# Patient Record
Sex: FEMALE | Race: WHITE | NOT HISPANIC OR LATINO | Employment: OTHER | URBAN - METROPOLITAN AREA
[De-identification: names, ages, dates, MRNs, and addresses within clinical notes are randomized per-mention and may not be internally consistent; named-entity substitution may affect disease eponyms.]

---

## 2017-10-16 ENCOUNTER — OFFICE VISIT (OUTPATIENT)
Dept: ORTHOPEDIC SURGERY | Facility: CLINIC | Age: 67
End: 2017-10-16

## 2017-10-16 VITALS — WEIGHT: 145 LBS | HEIGHT: 64 IN | BODY MASS INDEX: 24.75 KG/M2 | TEMPERATURE: 97.5 F

## 2017-10-16 DIAGNOSIS — M25.552 LEFT HIP PAIN: Primary | ICD-10-CM

## 2017-10-16 DIAGNOSIS — M70.62 TROCHANTERIC BURSITIS OF LEFT HIP: ICD-10-CM

## 2017-10-16 PROCEDURE — 73502 X-RAY EXAM HIP UNI 2-3 VIEWS: CPT | Performed by: ORTHOPAEDIC SURGERY

## 2017-10-16 PROCEDURE — 99203 OFFICE O/P NEW LOW 30 MIN: CPT | Performed by: ORTHOPAEDIC SURGERY

## 2017-10-16 PROCEDURE — 20610 DRAIN/INJ JOINT/BURSA W/O US: CPT | Performed by: ORTHOPAEDIC SURGERY

## 2017-10-16 RX ORDER — LIDOCAINE HYDROCHLORIDE 10 MG/ML
2 INJECTION, SOLUTION INFILTRATION; PERINEURAL
Status: COMPLETED | OUTPATIENT
Start: 2017-10-16 | End: 2017-10-16

## 2017-10-16 RX ORDER — BUPIVACAINE HYDROCHLORIDE 5 MG/ML
2 INJECTION, SOLUTION PERINEURAL
Status: COMPLETED | OUTPATIENT
Start: 2017-10-16 | End: 2017-10-16

## 2017-10-16 RX ORDER — METHYLPREDNISOLONE ACETATE 80 MG/ML
160 INJECTION, SUSPENSION INTRA-ARTICULAR; INTRALESIONAL; INTRAMUSCULAR; SOFT TISSUE
Status: COMPLETED | OUTPATIENT
Start: 2017-10-16 | End: 2017-10-16

## 2017-10-16 RX ADMIN — LIDOCAINE HYDROCHLORIDE 2 ML: 10 INJECTION, SOLUTION INFILTRATION; PERINEURAL at 13:44

## 2017-10-16 RX ADMIN — METHYLPREDNISOLONE ACETATE 160 MG: 80 INJECTION, SUSPENSION INTRA-ARTICULAR; INTRALESIONAL; INTRAMUSCULAR; SOFT TISSUE at 13:44

## 2017-10-16 RX ADMIN — BUPIVACAINE HYDROCHLORIDE 2 ML: 5 INJECTION, SOLUTION PERINEURAL at 13:44

## 2017-10-16 NOTE — PROGRESS NOTES
Patient: Audrey Zapata    YOB: 1950    Medical Record Number: [id number]    Chief Complaints:  Left hip pain    History of Present Illness:     67 y.o. female patient who presents with long history of lateral sided hip pain. Denies any discreet precipitating event or factor.   She describes gradual onset pain for the last month or so.  The pain as moderate, intermittent, and aching.  She localizes it to the lateral aspect of the hip.  It is worse going upstairs, when seated for a prolonged time, and rolling onto her left side at night.  Heat does help somewhat.  Denies any radicular symptoms down the leg.  Denies any other issues or complaints.    Allergies: No Known Allergies    Medications:     Home Medications:  No current outpatient prescriptions on file prior to visit.     No current facility-administered medications on file prior to visit.      Past Medical History:   Diagnosis Date   • Cancer     SKIN CANCER     Past Surgical History:   Procedure Laterality Date   •  SECTION     •  SECTION       Social History     Occupational History   • Not on file.     Social History Main Topics   • Smoking status: Never Smoker   • Smokeless tobacco: Never Used   • Alcohol use Yes      Comment: 2 DRINKS PER WEEK   • Drug use: No   • Sexual activity: Defer      Social History     Social History Narrative     Family History   Problem Relation Age of Onset   • Stroke Other    • Skin cancer Other        Review of Systems:      Constitutional: Denies fever, shaking or chills   Eyes: Denies change in visual acuity   HEENT: Denies nasal congestion or sore throat   Respiratory: Denies cough or shortness of breath   Cardiovascular: Denies chest pain or edema  Endocrine: Denies tremors, palpitations, intolerance of heat or cold, polyuria, polydipsia.  GI: Denies abdominal pain, nausea, vomiting, bloody stools or diarrhea  : Denies frequency, urgency, incontinence, retention, or  "nocturia.  Musculoskeletal: Denies numbness tingling or loss of motor function except as above  Integument: Denies rash, lesion or ulceration   Neurologic: Denies headache or focal weakness, deficits  Heme: Denies epistaxis, spontaneous or excessive bleeding, epistaxis, hematuria, melena, fatigue, enlarged or tender lymph nodes.      All other pertinent positives and negatives as noted above in HPI.    Physical Exam: 67 y.o. female     Vitals:    10/16/17 1323   Temp: 97.5 °F (36.4 °C)   Weight: 145 lb (65.8 kg)   Height: 64\" (162.6 cm)     General:  Patient is awake and alert.  Appears in no acute distress or discomfort.    Psych:  Affect and demeanor are appropriate.    Eyes:  Conjunctiva and sclera appear grossly normal.  Eyes track well and EOM seem to be intact.    Ears:  No gross abnormalities.  Hearing adequate for the exam.    Cardiovascular:  Regular rate and rhythm.    Lungs:  Good chest expansion.  Breathing unlabored.    Back:  No gross abnormalities.  No tenderness or step-offs.  No palpable masses.  Good motion.  Negative straight leg raise and cross straight leg raise.    Left lower extremity:  Skin is benign.  No palpable masses or adenopathy.  Focal TTP over greater trochanter.  Good hip motion.  Negative Stinchfield maneuver.  Negative GABRIELLE and FADIR impingement manuevers.  Good strength with hip flexion, extension, abduction.  Good strength distally with plantarflexion and dorsiflexion of ankle, toes.  Sensation to light touch intact.  Good pedal pulses with brisk cap refill.    Radiology: AP pelvis and lateral views of the left hip are ordered by myself and reviewed to evaluate the patient's complaint.  No comparison films are immediately available.  The x-rays show no obvious acute abnormalities, lesions, masses, significant degenerative changes, or other concerning findings.    Assessment/Plan:  Left hip trochanteric bursitis.    I had a lengthy discussion with her regarding options, both " surgical and non-surgical.  I have recommended that we start with a conservative approach and suggested anti-inflammatories, physical therapy, and an injection.  All options were thoroughly discussed with her and she consented to proceed with the injection.  We did discuss PT and they have elected for a home program for now.  Home exercises were demonstrated.    Large Joint Arthrocentesis  Date/Time: 10/16/2017 1:44 PM  Consent given by: patient  Site marked: site marked  Timeout: Immediately prior to procedure a time out was called to verify the correct patient, procedure, equipment, support staff and site/side marked as required   Supporting Documentation  Indications: pain   Procedure Details  Location: hip -   Preparation: Patient was prepped and draped in the usual sterile fashion  Needle size: 25 G  Approach: anterolateral  Medications administered: 2 mL lidocaine 1 %; 160 mg methylPREDNISolone acetate 80 MG/ML; 2 mL bupivacaine  Patient tolerance: patient tolerated the procedure well with no immediate complications          Diego Donohue MD    10/16/2017    CC to Funmi Esteban MD

## 2020-08-20 ENCOUNTER — TRANSCRIBE ORDERS (OUTPATIENT)
Dept: ADMINISTRATIVE | Facility: HOSPITAL | Age: 70
End: 2020-08-20

## 2020-08-20 DIAGNOSIS — R94.31 ABNORMAL EKG: Primary | ICD-10-CM

## 2020-08-28 ENCOUNTER — HOSPITAL ENCOUNTER (OUTPATIENT)
Dept: CARDIOLOGY | Facility: HOSPITAL | Age: 70
Discharge: HOME | End: 2020-08-28
Admitting: INTERNAL MEDICINE

## 2020-08-28 VITALS
HEIGHT: 64 IN | DIASTOLIC BLOOD PRESSURE: 66 MMHG | WEIGHT: 145 LBS | BODY MASS INDEX: 24.75 KG/M2 | HEART RATE: 64 BPM | SYSTOLIC BLOOD PRESSURE: 113 MMHG

## 2020-08-28 DIAGNOSIS — R94.31 ABNORMAL EKG: ICD-10-CM

## 2020-08-28 LAB
AORTIC DIMENSIONLESS INDEX: 0.7 (DI)
ASCENDING AORTA: 2.8 CM
BH CV ECHO MEAS - ACS: 1.8 CM
BH CV ECHO MEAS - AO MAX PG: 4.2 MMHG
BH CV ECHO MEAS - AO MEAN PG (FULL): 1 MMHG
BH CV ECHO MEAS - AO MEAN PG: 2 MMHG
BH CV ECHO MEAS - AO ROOT AREA (BSA CORRECTED): 1.5
BH CV ECHO MEAS - AO ROOT AREA: 5.3 CM^2
BH CV ECHO MEAS - AO ROOT DIAM: 2.6 CM
BH CV ECHO MEAS - AO V2 MAX: 102 CM/SEC
BH CV ECHO MEAS - AO V2 MEAN: 65.9 CM/SEC
BH CV ECHO MEAS - AO V2 VTI: 22.9 CM
BH CV ECHO MEAS - ASC AORTA: 2.8 CM
BH CV ECHO MEAS - AVA(I,A): 2.6 CM^2
BH CV ECHO MEAS - AVA(I,D): 2.6 CM^2
BH CV ECHO MEAS - BSA(HAYCOCK): 1.7 M^2
BH CV ECHO MEAS - BSA: 1.7 M^2
BH CV ECHO MEAS - BZI_BMI: 24.9 KILOGRAMS/M^2
BH CV ECHO MEAS - BZI_METRIC_HEIGHT: 162.6 CM
BH CV ECHO MEAS - BZI_METRIC_WEIGHT: 65.8 KG
BH CV ECHO MEAS - EDV(CUBED): 86.9 ML
BH CV ECHO MEAS - EDV(MOD-SP2): 63.2 ML
BH CV ECHO MEAS - EDV(MOD-SP4): 62.9 ML
BH CV ECHO MEAS - EDV(TEICH): 89.1 ML
BH CV ECHO MEAS - EF(CUBED): 59.8 %
BH CV ECHO MEAS - EF(MOD-BP): 58.7 %
BH CV ECHO MEAS - EF(MOD-SP2): 57.9 %
BH CV ECHO MEAS - EF(MOD-SP4): 58.2 %
BH CV ECHO MEAS - EF(TEICH): 51.6 %
BH CV ECHO MEAS - ESV(CUBED): 35 ML
BH CV ECHO MEAS - ESV(MOD-SP2): 26.6 ML
BH CV ECHO MEAS - ESV(MOD-SP4): 26.3 ML
BH CV ECHO MEAS - ESV(TEICH): 43.2 ML
BH CV ECHO MEAS - FS: 26.2 %
BH CV ECHO MEAS - IVS/LVPW: 1.2
BH CV ECHO MEAS - IVSD: 0.78 CM
BH CV ECHO MEAS - LAT PEAK E' VEL: 9.4 CM/SEC
BH CV ECHO MEAS - LV DIASTOLIC VOL/BSA (35-75): 36.9 ML/M^2
BH CV ECHO MEAS - LV MASS(C)D: 95.8 GRAMS
BH CV ECHO MEAS - LV MASS(C)DI: 56.1 GRAMS/M^2
BH CV ECHO MEAS - LV MAX PG: 2.1 MMHG
BH CV ECHO MEAS - LV MEAN PG: 1 MMHG
BH CV ECHO MEAS - LV SYSTOLIC VOL/BSA (12-30): 15.4 ML/M^2
BH CV ECHO MEAS - LV V1 MAX: 72.8 CM/SEC
BH CV ECHO MEAS - LV V1 MEAN: 49 CM/SEC
BH CV ECHO MEAS - LV V1 VTI: 17 CM
BH CV ECHO MEAS - LVIDD: 4.4 CM
BH CV ECHO MEAS - LVIDS: 3.3 CM
BH CV ECHO MEAS - LVLD AP2: 6.2 CM
BH CV ECHO MEAS - LVLD AP4: 6.6 CM
BH CV ECHO MEAS - LVLS AP2: 5.3 CM
BH CV ECHO MEAS - LVLS AP4: 5.6 CM
BH CV ECHO MEAS - LVOT AREA (M): 3.5 CM^2
BH CV ECHO MEAS - LVOT AREA: 3.5 CM^2
BH CV ECHO MEAS - LVOT DIAM: 2.1 CM
BH CV ECHO MEAS - LVPWD: 0.66 CM
BH CV ECHO MEAS - MED PEAK E' VEL: 7.4 CM/SEC
BH CV ECHO MEAS - MV A DUR: 0.17 SEC
BH CV ECHO MEAS - MV A MAX VEL: 63.1 CM/SEC
BH CV ECHO MEAS - MV DEC SLOPE: 161 CM/SEC^2
BH CV ECHO MEAS - MV DEC TIME: 235 SEC
BH CV ECHO MEAS - MV E MAX VEL: 43.2 CM/SEC
BH CV ECHO MEAS - MV E/A: 0.68
BH CV ECHO MEAS - MV MEAN PG: 1 MMHG
BH CV ECHO MEAS - MV P1/2T MAX VEL: 65.2 CM/SEC
BH CV ECHO MEAS - MV P1/2T: 118.6 MSEC
BH CV ECHO MEAS - MV V2 MEAN: 43.4 CM/SEC
BH CV ECHO MEAS - MV V2 VTI: 23.7 CM
BH CV ECHO MEAS - MVA P1/2T LCG: 3.4 CM^2
BH CV ECHO MEAS - MVA(P1/2T): 1.9 CM^2
BH CV ECHO MEAS - MVA(VTI): 2.5 CM^2
BH CV ECHO MEAS - PA ACC TIME: 0.1 SEC
BH CV ECHO MEAS - PA MAX PG: 4.5 MMHG
BH CV ECHO MEAS - PA PR(ACCEL): 32.7 MMHG
BH CV ECHO MEAS - PA V2 MAX: 106 CM/SEC
BH CV ECHO MEAS - PULM A REVS DUR: 0.1 SEC
BH CV ECHO MEAS - PULM A REVS VEL: 30.1 CM/SEC
BH CV ECHO MEAS - PULM DIAS VEL: 33.5 CM/SEC
BH CV ECHO MEAS - PULM S/D: 1.5
BH CV ECHO MEAS - PULM SYS VEL: 50.9 CM/SEC
BH CV ECHO MEAS - QP/QS: 0.47
BH CV ECHO MEAS - RAP SYSTOLE: 3 MMHG
BH CV ECHO MEAS - RV MEAN PG: 0 MMHG
BH CV ECHO MEAS - RV V1 MEAN: 24.3 CM/SEC
BH CV ECHO MEAS - RV V1 VTI: 8.7 CM
BH CV ECHO MEAS - RVOT AREA: 3.1 CM^2
BH CV ECHO MEAS - RVOT DIAM: 2 CM
BH CV ECHO MEAS - RVSP: 17 MMHG
BH CV ECHO MEAS - SI(AO): 71.3 ML/M^2
BH CV ECHO MEAS - SI(CUBED): 30.5 ML/M^2
BH CV ECHO MEAS - SI(LVOT): 34.5 ML/M^2
BH CV ECHO MEAS - SI(MOD-SP2): 21.5 ML/M^2
BH CV ECHO MEAS - SI(MOD-SP4): 21.5 ML/M^2
BH CV ECHO MEAS - SI(TEICH): 26.9 ML/M^2
BH CV ECHO MEAS - SV(AO): 121.6 ML
BH CV ECHO MEAS - SV(CUBED): 52 ML
BH CV ECHO MEAS - SV(LVOT): 58.9 ML
BH CV ECHO MEAS - SV(MOD-SP2): 36.6 ML
BH CV ECHO MEAS - SV(MOD-SP4): 36.6 ML
BH CV ECHO MEAS - SV(RVOT): 27.4 ML
BH CV ECHO MEAS - SV(TEICH): 45.9 ML
BH CV ECHO MEAS - TAPSE (>1.6): 1.7 CM
BH CV ECHO MEAS - TR MAX VEL: 190.3 CM/SEC
BH CV ECHO MEASUREMENTS AVERAGE E/E' RATIO: 5.14
BH CV VAS BP RIGHT ARM: NORMAL MMHG
BH CV XLRA - RV BASE: 2.8 CM
BH CV XLRA - RV LENGTH: 5.4 CM
BH CV XLRA - RV MID: 1.9 CM
BH CV XLRA - TDI S': 8.9 CM/SEC
LEFT ATRIUM VOLUME INDEX: 19.9 ML/M2
LV EF 2D ECHO EST: 60 %
MAXIMAL PREDICTED HEART RATE: 149 BPM
SINUS: 2.5 CM
STJ: 2.64 CM
STRESS TARGET HR: 127 BPM

## 2020-08-28 PROCEDURE — 93306 TTE W/DOPPLER COMPLETE: CPT

## 2020-08-28 PROCEDURE — 93306 TTE W/DOPPLER COMPLETE: CPT | Performed by: INTERNAL MEDICINE

## 2020-12-30 ENCOUNTER — OFFICE VISIT (OUTPATIENT)
Dept: ORTHOPEDIC SURGERY | Facility: CLINIC | Age: 70
End: 2020-12-30

## 2020-12-30 VITALS — BODY MASS INDEX: 24.75 KG/M2 | WEIGHT: 145 LBS | TEMPERATURE: 97.2 F | HEIGHT: 64 IN

## 2020-12-30 DIAGNOSIS — M25.561 ACUTE PAIN OF RIGHT KNEE: Primary | ICD-10-CM

## 2020-12-30 DIAGNOSIS — M17.11 PRIMARY OSTEOARTHRITIS OF RIGHT KNEE: ICD-10-CM

## 2020-12-30 PROCEDURE — 73562 X-RAY EXAM OF KNEE 3: CPT | Performed by: NURSE PRACTITIONER

## 2020-12-30 PROCEDURE — 20610 DRAIN/INJ JOINT/BURSA W/O US: CPT | Performed by: NURSE PRACTITIONER

## 2020-12-30 PROCEDURE — 99204 OFFICE O/P NEW MOD 45 MIN: CPT | Performed by: NURSE PRACTITIONER

## 2020-12-30 RX ORDER — MELOXICAM 15 MG/1
15 TABLET ORAL DAILY
Qty: 30 TABLET | Refills: 1 | Status: SHIPPED | OUTPATIENT
Start: 2020-12-30

## 2020-12-30 RX ORDER — MINOCYCLINE HYDROCHLORIDE 100 MG/1
100 CAPSULE ORAL DAILY
COMMUNITY

## 2020-12-30 RX ADMIN — METHYLPREDNISOLONE ACETATE 80 MG: 80 INJECTION, SUSPENSION INTRA-ARTICULAR; INTRALESIONAL; INTRAMUSCULAR; SOFT TISSUE at 15:55

## 2020-12-31 ENCOUNTER — APPOINTMENT (OUTPATIENT)
Dept: WOMENS IMAGING | Facility: HOSPITAL | Age: 70
End: 2020-12-31

## 2020-12-31 PROCEDURE — 77080 DXA BONE DENSITY AXIAL: CPT | Performed by: RADIOLOGY

## 2021-01-05 RX ORDER — METHYLPREDNISOLONE ACETATE 80 MG/ML
80 INJECTION, SUSPENSION INTRA-ARTICULAR; INTRALESIONAL; INTRAMUSCULAR; SOFT TISSUE
Status: COMPLETED | OUTPATIENT
Start: 2020-12-30 | End: 2020-12-30

## 2021-05-10 ENCOUNTER — OFFICE VISIT (OUTPATIENT)
Dept: ORTHOPEDIC SURGERY | Facility: CLINIC | Age: 71
End: 2021-05-10

## 2021-05-10 VITALS — TEMPERATURE: 97.1 F | BODY MASS INDEX: 25.61 KG/M2 | WEIGHT: 150 LBS | HEIGHT: 64 IN

## 2021-05-10 DIAGNOSIS — M17.11 PRIMARY OSTEOARTHRITIS OF RIGHT KNEE: Primary | ICD-10-CM

## 2021-05-10 PROCEDURE — 99213 OFFICE O/P EST LOW 20 MIN: CPT | Performed by: NURSE PRACTITIONER

## 2021-05-10 PROCEDURE — 20610 DRAIN/INJ JOINT/BURSA W/O US: CPT | Performed by: NURSE PRACTITIONER

## 2021-05-10 RX ORDER — KETOROLAC TROMETHAMINE 5 MG/ML
SOLUTION OPHTHALMIC
COMMUNITY
Start: 2021-03-31

## 2021-05-10 RX ORDER — ESTRADIOL 2 MG/1
RING VAGINAL
COMMUNITY
Start: 2021-04-14

## 2021-05-10 RX ADMIN — LIDOCAINE HYDROCHLORIDE 2 ML: 20 INJECTION, SOLUTION EPIDURAL; INFILTRATION; INTRACAUDAL; PERINEURAL at 14:20

## 2021-05-10 RX ADMIN — METHYLPREDNISOLONE ACETATE 80 MG: 80 INJECTION, SUSPENSION INTRA-ARTICULAR; INTRALESIONAL; INTRAMUSCULAR; SOFT TISSUE at 14:20

## 2021-05-10 NOTE — PROGRESS NOTES
"Chief Complaint:  Follow up right knee pain    HPI: Ms. Zapata follows up today for her right knee.  She tells me the injection helped tremendously.  She is concerned there may be more going on than just arthritis.  Reports she has discomfort with walking and traversing stairs at times.    Vitals:    05/10/21 1401   Temp: 97.1 °F (36.2 °C)   TempSrc: Temporal   Weight: 68 kg (150 lb)   Height: 162.6 cm (64\")     Exam:  Right knee is examined.  Skin is benign.  No atrophy, swellings, or masses.  Focal tenderness along the lateral joint line.  No effusion.  Knee motion is from full flexion to 100° of flexion.  Negative medial and lateral Jose D's test.  Negative Thessaly test.  No obvious instability.  Good strength with hip flexion, knee extension, ankle and great toe plantar flexion and dorsiflexion.  Sensation is intact distally.  Brisk capillary refill in the toes.  Palpable pedal pulses.  Good skin turgor.    Imaging: None taken.    Assessment: Right knee osteoarthritis    Plan: We had a lengthy discussion regarding osteoarthritis and the natural progression.  We reviewed her previous x-rays together.  I explained she has moderate medial and patellofemoral compartment arthritis.  This is likely causing the majority of her pain, but she could have a meniscal tear as well even though her exam is unremarkable today.  Since the last injection helped,  I recommended trying a repeat injection today.  She agreed.  The risks, benefits, and alternatives to an injection were discussed.  She acknowledged understanding and consented to proceed.  The injection was performed as noted below.  Going forward, she will follow up with me as needed.      Rani Yang, RADHA     05/10/2021      .Large Joint Arthrocentesis: R knee  Date/Time: 5/10/2021 2:20 PM  Consent given by: patient  Site marked: site marked  Timeout: Immediately prior to procedure a time out was called to verify the correct patient, procedure, equipment, support " staff and site/side marked as required   Supporting Documentation  Indications: pain   Procedure Details  Location: knee - R knee  Preparation: Patient was prepped and draped in the usual sterile fashion  Needle gauge: 21.  Approach: anterolateral  Medications administered: 2 mL lidocaine PF 2% 2 %; 80 mg methylPREDNISolone acetate 80 MG/ML  Patient tolerance: patient tolerated the procedure well with no immediate complications

## 2021-05-11 RX ORDER — METHYLPREDNISOLONE ACETATE 80 MG/ML
80 INJECTION, SUSPENSION INTRA-ARTICULAR; INTRALESIONAL; INTRAMUSCULAR; SOFT TISSUE
Status: COMPLETED | OUTPATIENT
Start: 2021-05-10 | End: 2021-05-10

## 2021-05-11 RX ORDER — LIDOCAINE HYDROCHLORIDE 20 MG/ML
2 INJECTION, SOLUTION EPIDURAL; INFILTRATION; INTRACAUDAL; PERINEURAL
Status: COMPLETED | OUTPATIENT
Start: 2021-05-10 | End: 2021-05-10

## 2021-07-01 ENCOUNTER — TELEPHONE (OUTPATIENT)
Dept: ORTHOPEDICS | Facility: OTHER | Age: 71
End: 2021-07-01

## 2021-07-01 DIAGNOSIS — M25.561 ACUTE PAIN OF RIGHT KNEE: Primary | ICD-10-CM

## 2021-07-01 DIAGNOSIS — M17.11 PRIMARY OSTEOARTHRITIS OF RIGHT KNEE: ICD-10-CM

## 2021-07-01 NOTE — TELEPHONE ENCOUNTER
Provider: AMANDA    Caller: PATIENT    Relationship to Patient: SELF    Phone Number: [phone]    Reason for Call: PATIENT STATES THAT HER MRI DIDN'T LAST TWO WEEKS AND CAN YOU PUT IN A ORDER FOR HER TO GET A MRI OF HER RIGHT KNEE    PLEASE ADVISE TO PATIENT

## 2021-07-02 NOTE — TELEPHONE ENCOUNTER
I spoke to MsReal Jodi.  She reports the injection for her right knee only lasted approximately 1-1/2 weeks.  She is very concerned that this pain being more than just arthritis.  She says the pain all started after twisting the knee.  I have entered a referral for her to have an MRI.  I will call her with results and come up with a plan at that time.  She acknowledged understanding and appreciated the assistance.

## 2021-07-29 ENCOUNTER — HOSPITAL ENCOUNTER (OUTPATIENT)
Dept: MRI IMAGING | Facility: HOSPITAL | Age: 71
Discharge: HOME | End: 2021-07-29
Admitting: NURSE PRACTITIONER

## 2021-07-29 DIAGNOSIS — M17.11 PRIMARY OSTEOARTHRITIS OF RIGHT KNEE: ICD-10-CM

## 2021-07-29 DIAGNOSIS — M25.561 ACUTE PAIN OF RIGHT KNEE: ICD-10-CM

## 2021-07-29 PROCEDURE — 73721 MRI JNT OF LWR EXTRE W/O DYE: CPT

## 2021-07-30 ENCOUNTER — TELEPHONE (OUTPATIENT)
Dept: ORTHOPEDIC SURGERY | Facility: CLINIC | Age: 71
End: 2021-07-30

## 2021-07-30 NOTE — TELEPHONE ENCOUNTER
Left message requesting a return call.  Attempting to provide patient with right knee MRI results.

## 2021-08-02 ENCOUNTER — TELEPHONE (OUTPATIENT)
Dept: ORTHOPEDIC SURGERY | Facility: CLINIC | Age: 71
End: 2021-08-02

## 2021-08-02 NOTE — TELEPHONE ENCOUNTER
I spoke to MsReal Jodi.  I provided her with the right knee MRI results.  She has a meniscal tear and tricompartmental osteoarthritis.  I recommended she follow-up with Dr. Donohue to discuss surgical options.  She agreed.  I will have our staff schedule this appointment for her.

## 2021-08-02 NOTE — TELEPHONE ENCOUNTER
Caller: Audrey Zapata    Relationship: Self    Best call back number: [phone]    What is the best time to reach you: ANY    Who are you requesting to speak with (clinical staff, provider,  specific staff member): JIMMY LEPE    Do you know the name of the person who called: JIMMY LEPE    What was the call regarding: TEST RESULTS    Do you require a callback: YES

## 2021-08-16 ENCOUNTER — OFFICE VISIT (OUTPATIENT)
Dept: ORTHOPEDIC SURGERY | Facility: CLINIC | Age: 71
End: 2021-08-16

## 2021-08-16 VITALS — HEIGHT: 64 IN | BODY MASS INDEX: 25.61 KG/M2 | TEMPERATURE: 97.1 F | WEIGHT: 150 LBS

## 2021-08-16 DIAGNOSIS — M17.10 ARTHRITIS OF KNEE: ICD-10-CM

## 2021-08-16 DIAGNOSIS — M17.11 PRIMARY OSTEOARTHRITIS OF RIGHT KNEE: Primary | ICD-10-CM

## 2021-08-16 PROCEDURE — 99213 OFFICE O/P EST LOW 20 MIN: CPT | Performed by: ORTHOPAEDIC SURGERY

## 2021-08-16 RX ORDER — MELOXICAM 15 MG/1
15 TABLET ORAL DAILY PRN
Qty: 30 TABLET | Refills: 2 | Status: SHIPPED | OUTPATIENT
Start: 2021-08-16

## 2021-08-16 NOTE — PROGRESS NOTES
Patient:Audrey Zapata    YOB: 1950    Medical Record Number:[id number]    Chief Complaints: Right knee pain    History of Present Illness:     70 y.o. female patient who presents for her right knee.  I saw her for this issue 6 or 7 years ago per her report.  We do not have any records of that visit.  It looks like she has seen Rani several times for the issue over the past year.  She has had a couple of injections.  The first injection helped tremendously but the second injection did not.  Her pain is moderate, constant and aching.  Most of her pain is anterior and lateral.  She does get some clicking and popping but it does not have any catching or locking.  She has noticed that she is unable to fully extend her knee.    Allergies:No Known Allergies    Home Medications:    Current Outpatient Medications:   •  Estring 2 MG vaginal ring, , Disp: , Rfl:   •  ketorolac (ACULAR) 0.5 % ophthalmic solution, PLACE 1 DROP INTO RIGHT EYE 3 TIMES A DAY FOR 3 DAYS, THEN STOP, Disp: , Rfl:   •  minocycline (MINOCIN,DYNACIN) 100 MG capsule, Take 100 mg by mouth Daily., Disp: , Rfl:   •  meloxicam (Mobic) 15 MG tablet, Take 1 tablet by mouth Daily., Disp: 30 tablet, Rfl: 1    Past Medical History:   Diagnosis Date   • Cancer (CMS/[facility])     SKIN CANCER       Past Surgical History:   Procedure Laterality Date   •  SECTION     •  SECTION         Social History     Occupational History   • Not on file   Tobacco Use   • Smoking status: Never Smoker   • Smokeless tobacco: Never Used   Vaping Use   • Vaping Use: Never used   Substance and Sexual Activity   • Alcohol use: Yes     Comment: 2 DRINKS PER WEEK   • Drug use: No   • Sexual activity: Defer      Social History     Social History Narrative   • Not on file       Family History   Problem Relation Age of Onset   • Stroke Other    • Skin cancer Other        Review of Systems:      Constitutional: Denies fever, shaking or chills   Eyes: Denies change  "in visual acuity   HEENT: Denies nasal congestion or sore throat   Respiratory: Denies cough or shortness of breath   Cardiovascular: Denies chest pain or edema  Endocrine: Denies tremors, palpitations, intolerance of heat or cold, polyuria, polydipsia.  GI: Denies abdominal pain, nausea, vomiting, bloody stools or diarrhea  : Denies frequency, urgency, incontinence, retention, or nocturia.  Musculoskeletal: Denies numbness, tingling or loss of motor function except as above  Integument: Denies rash, lesion or ulceration   Neurologic: Denies headache or focal weakness, deficits  Heme: Denies spontaneous or excessive bleeding, epistaxis, hematuria, melena, fatigue, enlarged or tender lymph nodes.      All other pertinent positives and negatives as noted above in HPI.    Physical Exam:70 y.o. female  Vitals:    08/16/21 1402   Temp: 97.1 °F (36.2 °C)   Weight: 68 kg (150 lb)   Height: 162.6 cm (64.02\")     General:  Patient is awake and alert.  Appears in no acute distress or discomfort.    Psych:  Affect and demeanor are appropriate.    Extremities:  Right knee is examined.  Skin is benign.  No atrophy, swellings, or masses.  Mild tenderness along the lateral joint line as well as the anterolateral patellofemoral retinaculum.  There is a trace effusion.  Knee motion is from 10 degrees shy of full extension to 115 degrees of flexion.  Negative medial and lateral Jose D's test.  No instability.  Good strength with hip flexion, knee extension, ankle and great toe plantar flexion and dorsiflexion.  Sensation is intact distally.  Brisk capillary refill in the toes.  Palpable pedal pulses.  Good skin turgor.    Imaging: Her previous x-rays of the right knee including AP, merchant's and lateral views are reviewed.  These are from December 2020.  She appears to have tricompartment osteoarthritis with the majority of the advanced disease involving the medial and patellofemoral compartments.  MRI of the right knee is " reviewed along with the associated report.  She has tricompartment osteoarthritis with full-thickness chondral loss in the medial and patellofemoral compartments.  She has a degenerative tear of the medial meniscus with meniscal extrusion.    Assessment/Plan: Right knee osteoarthritis with associated medial meniscal tear and meniscal extrusion    I showed her the x-rays and MRI.  We discussed the significance of the findings and her options.  I think she was hoping that there might be something that could be done arthroscopically to address the meniscus.  I was tin with her about the fact that I think it is very unlikely that an arthroscopy could help her appreciably.  Unfortunately, it looks like she is headed towards an arthroplasty.  I recommend continued conservative treatment for now.  I suggest that we get her into therapy to work on some strengthening of the extremity but also to help her work on regaining her extension.  I have agreed to give her a prescription for meloxicam to take on an as-needed basis.  Risk of this medicine were discussed.  I recommend she give this about 6 weeks and then follow-up with me if no better.    Diego Donohue MD    08/16/2021

## 2021-09-13 ENCOUNTER — TREATMENT (OUTPATIENT)
Dept: PHYSICAL THERAPY | Facility: CLINIC | Age: 71
End: 2021-09-13

## 2021-09-13 DIAGNOSIS — G89.29 CHRONIC PAIN OF RIGHT KNEE: Primary | ICD-10-CM

## 2021-09-13 DIAGNOSIS — M17.11 PRIMARY OSTEOARTHRITIS OF RIGHT KNEE: ICD-10-CM

## 2021-09-13 DIAGNOSIS — R26.2 DIFFICULTY WALKING DOWN STAIRS: ICD-10-CM

## 2021-09-13 DIAGNOSIS — M25.561 CHRONIC PAIN OF RIGHT KNEE: Primary | ICD-10-CM

## 2021-09-13 PROCEDURE — 97110 THERAPEUTIC EXERCISES: CPT | Performed by: PHYSICAL THERAPIST

## 2021-09-13 PROCEDURE — 97161 PT EVAL LOW COMPLEX 20 MIN: CPT | Performed by: PHYSICAL THERAPIST

## 2021-09-13 NOTE — PROGRESS NOTES
Physical Therapy Initial Evaluation and Plan of Care    Patient: Audrey Zapata  : 1950  Diagnosis/ICD-10 Code:  Chronic pain of right knee [M25.561, G89.29]  Referring practitioner: Diego Donohue MD    Subjective Evaluation    History of Present Illness  Onset date: increased in the last year.  Mechanism of injury: Pt is a 70 y/o WF who reports to the clinic with right knee pain over the last year.  Pt had a torn meniscus a long time ago-had x-ray and never had an MRI, so this time the knee pain has progressively gotten worse so pt insisted on a MRI-it showed a tear in the meniscus.  Pt states her knee hurts on the right lateral knee, but the tear is on the inside of her knee.  Pt states 5 yrs ago she tore the meniscus in a dancing show-she twisted her knee-treated it with cortisone shots, drained it, and she thinks she may have had PT.   Pt states her knee hurts to walk down the steps and she noticed she can't straighten the knee.      Subjective comment: can call MD if not better in 6 weeks for follow up appointment.  Pt states she does not want to get a TKR yet.    Patient Occupation: retired  Quality of life: good    Pain  Current pain ratin  At worst pain ratin  Location: right inferior patella, lateral knee   Quality: sharp and dull ache (aches at night )  Relieving factors: medications and support (meloxicam PRN )  Aggravating factors: stairs and ambulation (bending to put shoes on, getting out of car, )    Hand dominance: right    Diagnostic Tests  X-ray: abnormal  MRI studies: abnormal    Treatments  Previous treatment: injection treatment and physical therapy  Current treatment: injection treatment  Patient Goals  Patient goals for therapy: decreased pain, increased motion and increased strength  Patient goal: wants to return to walking 2-3 miles per day.  Pt attends once per week [facility].             Objective          Tenderness     Right Knee   Tenderness in the lateral patella  and medial patella.     Additional Tenderness Details  Mild tenderness over medial and lateral patella     Active Range of Motion   Left Knee   Flexion: 135 degrees   Extension: 0 degrees   Extensor lag: with pain    Right Knee   Flexion: 111 degrees with pain  Extension: 5 degrees     Patellar Mobility   Left Knee Patellar tendons within functional limits include the medial, lateral, superior and inferior.     Right Knee Hypomobile in the medial, lateral, superior and inferior patellar tendon(s).     Strength/Myotome Testing     Left Hip   Planes of Motion   Flexion: 4+  Extension: 5  Abduction: 5  Adduction: 4    Right Hip   Planes of Motion   Flexion: 5  Extension: 5  Abduction: 5  Adduction: 4    Left Knee   Flexion: 5  Extension: 5  Quadriceps contraction: good    Right Knee   Flexion: 4  Extension: 5  Quadriceps contraction: good    Tests     Left Hip   Negative Ely's, GABRIELLE and piriformis.   90/90 SLR: Negative.   SLR: Negative.     Right Hip   Negative Ely's, GABRIELLE and piriformis.   90/90 SLR: Negative.  SLR: Negative.     Left Knee   Negative anterior drawer, lateral Jose D, medial Jose D, patella-femoral grind, posterior drawer, valgus stress test at 0 degrees, valgus stress test at 30 degrees, varus stress test at 0 degrees and varus stress test at 30 degrees.     Right Knee   Positive medial Jose D and patella-femoral grind.   Negative anterior drawer, lateral Jose D, posterior drawer, valgus stress test at 0 degrees, valgus stress test at 30 degrees, varus stress test at 0 degrees and varus stress test at 30 degrees.     Additional Tests Details  Pt with some pain and popping with right medial Jose D     Ambulation     Observational Gait   Gait: within functional limits     Additional Observational Gait Details  Pt enters department without an antalgic gait or using an AD.            Assessment & Plan     Assessment  Impairments: abnormal or restricted ROM, activity intolerance, impaired  balance, impaired physical strength, lacks appropriate home exercise program, pain with function and weight-bearing intolerance  Assessment details: Pt is a 72 y/o WF who reports to the clinic with c/o right knee pain, tenderness, decreased ROM, decreased strength, and decreased functional mobility with descending steps, dressing her LEs, getting into and out of the car and ambulation.       Prognosis: good  Functional Limitations: walking, uncomfortable because of pain and standing  Goals  Plan Goals: STGs: 2-3 weeks  1. Decrease right  knee pain 4/10 with dressing her LEs and getting into/out of the car.    2. Increase right knee AROM 2-120 degrees   3.  Decrease medial and lateral patella tenderness to minimal to none with palpation      LTGs: 4-8 weeks  1.  Pt Independent with HEP.  2.  Increase right knee strength to 5/5   3.  Increase right hip add strength to 5/5   4.  Decrease right knee pain to a 2/10 with descending steps, dressing her LE's, getting out of the car, and ambulating at least one mile.          Plan  Therapy options: will be seen for skilled physical therapy services  Planned modality interventions: cryotherapy, electrical stimulation/Russian stimulation, thermotherapy (hydrocollator packs), ultrasound and dry needling  Other planned modality interventions: KT taping   Planned therapy interventions: joint mobilization, home exercise program, gait training, flexibility, strengthening, stretching, functional ROM exercises, soft tissue mobilization, therapeutic activities and manual therapy  Duration in visits: 16  Treatment plan discussed with: patient        Manual Therapy:         mins  70539;  Therapeutic Exercise:    26     mins  55755;     Neuromuscular Natasha:        mins  11376;    Therapeutic Activity:          mins  10908;     Gait Training:           mins  76564;     Ultrasound:     8     mins  74408;    Electrical Stimulation:         mins  94200 ( );  Dry Needling          mins  self-pay    Timed Treatment:  34    mins   Total Treatment:     56   mins    PT SIGNATURE: Divya Vijay Butler, PT   DATE TREATMENT INITIATED: 9/13/2021    Medicare Initial Certification  Certification Period: 12/12/2021  I certify that the therapy services are furnished while this patient is under my care.  The services outlined above are required by this patient, and will be reviewed every 90 days.     PHYSICIAN: Diego Donohue MD      DATE:     Please sign and return via fax to [phone].. Thank you, [facility].

## 2021-09-16 ENCOUNTER — TREATMENT (OUTPATIENT)
Dept: PHYSICAL THERAPY | Facility: CLINIC | Age: 71
End: 2021-09-16

## 2021-09-16 DIAGNOSIS — G89.29 CHRONIC PAIN OF RIGHT KNEE: Primary | ICD-10-CM

## 2021-09-16 DIAGNOSIS — M17.11 PRIMARY OSTEOARTHRITIS OF RIGHT KNEE: ICD-10-CM

## 2021-09-16 DIAGNOSIS — M25.561 CHRONIC PAIN OF RIGHT KNEE: Primary | ICD-10-CM

## 2021-09-16 DIAGNOSIS — R26.2 DIFFICULTY WALKING DOWN STAIRS: ICD-10-CM

## 2021-09-16 PROCEDURE — 97035 APP MDLTY 1+ULTRASOUND EA 15: CPT | Performed by: PHYSICAL THERAPIST

## 2021-09-16 PROCEDURE — 97110 THERAPEUTIC EXERCISES: CPT | Performed by: PHYSICAL THERAPIST

## 2021-09-16 NOTE — PROGRESS NOTES
Physical Therapy Daily Progress Note        Patient: Audrey Zapata   : 1950  Diagnosis/ICD-10 Code:  Chronic pain of right knee [M25.561, G89.29]  Referring practitioner: No ref. provider found  Date of Initial Visit: Type: THERAPY  Noted: 2021  Today's Date: 2021  Patient seen for 2 sessions         Audrey Zapata reports: her knees isn't hurting right now.  She said it hurts when it wants to but always with going downstairs.  She didn't get through all of her exercises yesterday and her first day here was the day before.  She reported she felt better after last session.         Subjective     Objective   See Exercise, Manual, and Modality Logs for complete treatment.       Assessment/Plan  Decreased TKE noted in stance and with amb thus added hamstring stretch and toe raises to address.  Also added heelslides for progression of flexion ROM and hamstring stretch for additional strengthening.  Will monitor tolerance to progressions made this date and will progress as able next session.  Continued with US and pt reported relief following.  Instructed to continue with initial HEP and will update next session if tolerated.     Progress per Plan of Care           Manual Therapy:         mins  56916;  Therapeutic Exercise:    30     mins  78349;     Neuromuscular Natasha:        mins  61401;    Therapeutic Activity:          mins  40088;     Gait Training:           mins  78328;     Ultrasound:     8     mins  42247;    Electrical Stimulation:         mins  79044 ( );  Dry Needling          mins self-pay    Timed Treatment:   38   mins   Total Treatment:     38   mins    Marge Davila PTA  Physical Therapist Assistant

## 2021-09-20 ENCOUNTER — TREATMENT (OUTPATIENT)
Dept: PHYSICAL THERAPY | Facility: CLINIC | Age: 71
End: 2021-09-20

## 2021-09-20 DIAGNOSIS — G89.29 CHRONIC PAIN OF RIGHT KNEE: Primary | ICD-10-CM

## 2021-09-20 DIAGNOSIS — R26.2 DIFFICULTY WALKING DOWN STAIRS: ICD-10-CM

## 2021-09-20 DIAGNOSIS — M17.11 PRIMARY OSTEOARTHRITIS OF RIGHT KNEE: ICD-10-CM

## 2021-09-20 DIAGNOSIS — M25.561 CHRONIC PAIN OF RIGHT KNEE: Primary | ICD-10-CM

## 2021-09-20 PROCEDURE — 97530 THERAPEUTIC ACTIVITIES: CPT | Performed by: PHYSICAL THERAPIST

## 2021-09-20 PROCEDURE — 97110 THERAPEUTIC EXERCISES: CPT | Performed by: PHYSICAL THERAPIST

## 2021-09-20 NOTE — PATIENT INSTRUCTIONS
Access Code: [email or web address]  URL: [email or web address]  Date: 09/20/2021  Prepared by: Marge Davila    Exercises  Active Straight Leg Raise with Quad Set - 1 x daily - 7 x weekly - 1 sets - 25 reps  Supine Quad Set - 1 x daily - 7 x weekly - 1 sets - 10 reps - 5 sec hold  Short Arc Quad with Ball Squeeze - 1 x daily - 7 x weekly - 1 sets - 20 reps - 5 sec hold  Sidelying Hip Adduction - 1 x daily - 7 x weekly - 1 sets - 15 reps  Standing Terminal Knee Extension with Resistance - 1 x daily - 7 x weekly - 1 sets - 15 reps - 5 sec hold  Supine Hamstring Stretch with Strap - 2 x daily - 7 x weekly - 1 sets - 5 reps - 10 hold  Supine Heel Slide with Strap - 1 x daily - 7 x weekly - 1 sets - 10 reps - 5 hold  Heel Toe Raises with Counter Support - 1 x daily - 7 x weekly - 1 sets - 20 reps  Seated Long Arc Quad - 1 x daily - 7 x weekly - 1 sets - 20 reps - 5 hold

## 2021-09-20 NOTE — PROGRESS NOTES
Physical Therapy Daily Progress Note        Patient: Audrey Zapata   : 1950  Diagnosis/ICD-10 Code:  Chronic pain of right knee [M25.561, G89.29]  Referring practitioner: Diego Donohue MD  Date of Initial Visit: Type: THERAPY  Noted: 2021  Today's Date: 2021  Patient seen for 3 sessions         Audrey Zapata reports: her knee is doing better.  She was able to do the steps at [employer] without pain.  She said she was sitting on the couch for a few minutes before coming and her  sat on her foot and twisted her knee and she got a sharp pain in her knee.        Subjective     Objective          Active Range of Motion     Right Knee   Flexion: Right knee active flexion: AAROM with yrort=867 deg.   Extension: Right knee active extension: lacking 3 deg.       See Exercise, Manual, and Modality Logs for complete treatment.       Assessment/Plan  Gentle progression of exercises this date including step ups and addition of LE weights.  No complaints reported with progression of exercises.  Improved ROM noted this date as well as improved function reported with being able to descend steps at [employer] pain free over the weekend.  Plan to add resisted sidestepping and step downs next session if tolerated.  Continued with US to assist with pain control.        Progress per Plan of Care           Manual Therapy:         mins  10662;  Therapeutic Exercise:    35     mins  04072;     Neuromuscular Natasha:        mins  30394;    Therapeutic Activity:     8     mins  05655;     Gait Training:           mins  58096;     Ultrasound:          mins  40061;    Electrical Stimulation:         mins  91994 (MC );  Dry Needling          mins self-pay    Timed Treatment:   43   mins   Total Treatment:     57   mins    Marge Davila PTA  Physical Therapist Assistant

## 2021-09-30 ENCOUNTER — TREATMENT (OUTPATIENT)
Dept: PHYSICAL THERAPY | Facility: CLINIC | Age: 71
End: 2021-09-30

## 2021-09-30 DIAGNOSIS — M17.11 PRIMARY OSTEOARTHRITIS OF RIGHT KNEE: ICD-10-CM

## 2021-09-30 DIAGNOSIS — G89.29 CHRONIC PAIN OF RIGHT KNEE: Primary | ICD-10-CM

## 2021-09-30 DIAGNOSIS — R26.2 DIFFICULTY WALKING DOWN STAIRS: ICD-10-CM

## 2021-09-30 DIAGNOSIS — M25.561 CHRONIC PAIN OF RIGHT KNEE: Primary | ICD-10-CM

## 2021-09-30 PROCEDURE — 97110 THERAPEUTIC EXERCISES: CPT | Performed by: PHYSICAL THERAPIST

## 2021-09-30 PROCEDURE — 97530 THERAPEUTIC ACTIVITIES: CPT | Performed by: PHYSICAL THERAPIST

## 2021-09-30 NOTE — PROGRESS NOTES
Physical Therapy Daily Progress Note        Patient: Audrey Zapata   : 1950  Diagnosis/ICD-10 Code:  Chronic pain of right knee [M25.561, G89.29]  Referring practitioner: Diego Donohue MD  Date of Initial Visit: Type: THERAPY  Noted: 2021  Today's Date: 2021  Patient seen for 4 sessions         Audrey Zapata reports: her knee is ok today.  She is aware that it is there and rated it a 3/10.  She said her pain was an 8/10 yesterday and thinks it is from walking the night before.  She took the medicine her MD gave her which she never takes and that helped.         Subjective     Objective          Active Range of Motion     Right Knee   Flexion: 114 (AAROM with wdyre=261 deg) degrees   Extension: Right knee active extension: lacking 1 deg.     Strength/Myotome Testing     Right Knee   Quadriceps contraction: good      See Exercise, Manual, and Modality Logs for complete treatment.       Assessment/Plan  Improved ROM and good quad contraction this date.  Able to progress strengthening this date without increased complaints of pain including addition of step downs which typically bother her on normal height steps.  She denied lateral knee pain however reported medial knee pain with palpation and stated her pain was below the knee cap.  Continued with US to address.  Pt will be out of town for the next month.  Will reassess at that time.     Other           Manual Therapy:         mins  62423;  Therapeutic Exercise:    35     mins  76727;     Neuromuscular Natasha:        mins  36503;    Therapeutic Activity:     10     mins  48432;     Gait Training:           mins  38291;     Ultrasound:          mins  14558;    Electrical Stimulation:         mins  18335 ( );  Dry Needling          mins self-pay    Timed Treatment:   45   mins   Total Treatment:     53   mins    Marge Davila PTA  Physical Therapist Assistant

## 2021-11-15 ENCOUNTER — TREATMENT (OUTPATIENT)
Dept: PHYSICAL THERAPY | Facility: CLINIC | Age: 71
End: 2021-11-15

## 2021-11-15 DIAGNOSIS — M17.11 PRIMARY OSTEOARTHRITIS OF RIGHT KNEE: ICD-10-CM

## 2021-11-15 DIAGNOSIS — M25.561 CHRONIC PAIN OF RIGHT KNEE: Primary | ICD-10-CM

## 2021-11-15 DIAGNOSIS — R26.2 DIFFICULTY WALKING DOWN STAIRS: ICD-10-CM

## 2021-11-15 DIAGNOSIS — G89.29 CHRONIC PAIN OF RIGHT KNEE: Primary | ICD-10-CM

## 2021-11-15 PROCEDURE — 97110 THERAPEUTIC EXERCISES: CPT | Performed by: PHYSICAL THERAPIST

## 2021-11-15 PROCEDURE — 97035 APP MDLTY 1+ULTRASOUND EA 15: CPT | Performed by: PHYSICAL THERAPIST

## 2021-11-15 NOTE — PROGRESS NOTES
Re-Assessment / Re-Certification      Patient: Audrey Zapata   : 1950  Diagnosis/ICD-10 Code:  Chronic pain of right knee [M25.561, G89.29]  Referring practitioner: Diego Donohue MD  Date of Initial Visit: 11/15/2021  Today's Date: 11/15/2021  Patient seen for 5 sessions      Subjective:   Audrey Zapata reports: her knee is doing good today, but at times it hurts a lot when she goes down the steps or tries to bend it.  Then sometimes she can go down the steps?  Pt rates her knee pain today a 1/10, then other days it can be bad and rates it a 7/10.  The pain is intermittent.   Subjective Questionnaire: LEFS: 66/80  Clinical Progress: improved  Home Program Compliance: Yes  Treatment has included: therapeutic exercise, therapeutic activity, ultrasound and pt was instructed in a HEP.      Subjective   Objective          Tenderness     Right Knee   Tenderness in the inferior patella, lateral patella, medial joint line and medial patella.     Additional Tenderness Details  Mild right patella and knee point tenderness     Active Range of Motion     Right Knee   Flexion: 117 degrees   Extension: 1 degrees     Strength/Myotome Testing     Right Hip   Planes of Motion   Adduction: 4+    Right Knee   Flexion: 5  Extension: 5    Ambulation     Observational Gait   Gait: within functional limits   Walking speed, stride length, left stance time, right stance time, left swing time, right swing time, left step length and right step length within functional limits.   Left foot contact pattern: heel to toe  Right foot contact pattern: heel to toe      Assessment & Plan     Assessment  Assessment details: Pt is doing fairly well, but still has right knee pain, hip add weakness, and point tenderness over the right lateral, inferior, and medial knee.  Pt has been in [place] the last month and is returning to PT today to resume treatment.  Pt was able to perform all exercises with increases in reps without c/o pain.  Pt has only met  1/7 goals, but is showing progress towards accomplishing the other goals.  Pt reported less right knee pain after US treatment.  Plan to continue seeing pt 2x/week for stretching, strengthening, and modalities PRN for pain relief for another month.        Progress toward previous goals: Partially Met    Goals  Plan Goals: STGs: 2-3 weeks  1. Decrease right  knee pain 4/10 with dressing her LEs and getting into/out of the car.  Partially met, rates pain a 7/10 with descending steps.    2. Increase right knee AROM 2-120 degrees   Progressing 1-117 degrees   3.  Decrease medial and lateral patella tenderness to minimal to none with palpation   not met     LTGs: 4-8 weeks  1.  Pt Independent with HEP.  Progressing   2.  Increase right knee strength to 5/5   MET   3.  Increase right hip add strength to 5/5    NOT MET  HIP ADD 4+/5   4. Decrease right knee pain to a 2/10 with descending steps, dressing her LE's, getting out of the car, and ambulating at least one mile.  NOT MET     Recommendations: Continue as planned  Timeframe: 1 month  Prognosis to achieve goals: good    PT Signature: Divya Butler, PT [place] # [id number]  Electronically signed 11/15/2021       Manual Therapy:         mins  46040;  Therapeutic Exercise:   21      mins  19021;     Neuromuscular Natasha:        mins  74074;    Therapeutic Activity:          mins  44744;     Gait Training:           mins  73994;     Ultrasound:    8      mins  98190;    Electrical Stimulation:         mins  00093 ( );  Traction                       ___ mins  66726    Timed Treatment:  29    mins   Total Treatment:     49   mins

## 2021-11-18 ENCOUNTER — TREATMENT (OUTPATIENT)
Dept: PHYSICAL THERAPY | Facility: CLINIC | Age: 71
End: 2021-11-18

## 2021-11-18 DIAGNOSIS — M17.11 PRIMARY OSTEOARTHRITIS OF RIGHT KNEE: ICD-10-CM

## 2021-11-18 DIAGNOSIS — G89.29 CHRONIC PAIN OF RIGHT KNEE: Primary | ICD-10-CM

## 2021-11-18 DIAGNOSIS — M25.561 CHRONIC PAIN OF RIGHT KNEE: Primary | ICD-10-CM

## 2021-11-18 DIAGNOSIS — R26.2 DIFFICULTY WALKING DOWN STAIRS: ICD-10-CM

## 2021-11-18 PROCEDURE — 97110 THERAPEUTIC EXERCISES: CPT | Performed by: PHYSICAL THERAPIST

## 2021-11-18 NOTE — PROGRESS NOTES
Physical Therapy Daily Progress Note    Visit # : 6  Audrey Zapata reports: she is feeling better and denies having any increased soreness.      Subjective     Objective   See Exercise, Manual, and Modality Logs for complete treatment.     Limited time today secondary to pt being 11 minutes late for appointment.      Assessment & Plan     Assessment  Assessment details: Pt tolerated treatment well and denies having any soreness from last treatment.  Pt reported less knee pain.  Pt was able to add bands with lateral walking and increased reps with some of the strengthening exercises.  Continued with the US treatments for pain and tenderness lateral right knee.  Will continue to see pt 2x/week for strengthening, stretching, and modalities PRN for pain.        Progress per Plan of Care         Manual Therapy:         mins  02552;  Therapeutic Exercise:   19      mins  24247;     Neuromuscular Natasha:        mins  86714;    Therapeutic Activity:          mins  78874;     Gait Training:           mins  54372;     Ultrasound:    8      mins  02641;  Performed by PT tech   Electrical Stimulation:         mins  83505 ( );  Dry Needling          mins self-pay    Timed Treatment:  19    mins   Total Treatment:     39   mins    Divya Butler, PT  Physical Therapist

## 2021-11-22 ENCOUNTER — TREATMENT (OUTPATIENT)
Dept: PHYSICAL THERAPY | Facility: CLINIC | Age: 71
End: 2021-11-22

## 2021-11-22 DIAGNOSIS — R26.2 DIFFICULTY WALKING DOWN STAIRS: ICD-10-CM

## 2021-11-22 DIAGNOSIS — M17.11 PRIMARY OSTEOARTHRITIS OF RIGHT KNEE: ICD-10-CM

## 2021-11-22 DIAGNOSIS — M25.561 CHRONIC PAIN OF RIGHT KNEE: Primary | ICD-10-CM

## 2021-11-22 DIAGNOSIS — G89.29 CHRONIC PAIN OF RIGHT KNEE: Primary | ICD-10-CM

## 2021-11-22 PROCEDURE — 97110 THERAPEUTIC EXERCISES: CPT | Performed by: PHYSICAL THERAPIST

## 2021-11-22 PROCEDURE — 97530 THERAPEUTIC ACTIVITIES: CPT | Performed by: PHYSICAL THERAPIST

## 2021-11-22 NOTE — PROGRESS NOTES
Physical Therapy Daily Progress Note        Patient: Audrey aZpata   : 1950  Diagnosis/ICD-10 Code:  Chronic pain of right knee [M25.561, G89.29]  Referring practitioner: Diego Donohue MD  Date of Initial Visit: Type: THERAPY  Noted: 2021  Today's Date: 2021  Patient seen for 7 sessions         Audrey Zapata reports: she was able to go down the stairs yesterday without pain.  No problems to report.  She thinks her pain is less today but she said she has been running around today and may not be thinking about it as much.         Subjective     Objective          Tenderness     Right Knee   Tenderness in the lateral patella.       See Exercise, Manual, and Modality Logs for complete treatment.       Assessment/Plan  Maintained open chain exercises d/t continued fatigue with progressions made in previous session.  Despite this she was able to progress step up/down activities for functional strengthening towards more normal step height with reports of difficulty however no increased pain.  Continued tenderness on (R) lateral knee/patella thus continued with US to address.  Will monitor tolerance to progressions and continue to progress towards decreased pain with functional activities.      Progress per Plan of Care           Manual Therapy:         mins  63127;  Therapeutic Exercise:    26     mins  70931;     Neuromuscular Natasha:        mins  35981;    Therapeutic Activity:     10     mins  49825;     Gait Training:           mins  41549;     Ultrasound:     8     mins  18773;    Electrical Stimulation:         mins  43837 ( );  Dry Needling          mins self-pay    Timed Treatment:   44   mins   Total Treatment:     50   mins    Marge Davila PTA  Physical Therapist Assistant

## 2021-12-02 ENCOUNTER — TREATMENT (OUTPATIENT)
Dept: PHYSICAL THERAPY | Facility: CLINIC | Age: 71
End: 2021-12-02

## 2021-12-02 DIAGNOSIS — M17.11 PRIMARY OSTEOARTHRITIS OF RIGHT KNEE: ICD-10-CM

## 2021-12-02 DIAGNOSIS — M25.561 CHRONIC PAIN OF RIGHT KNEE: Primary | ICD-10-CM

## 2021-12-02 DIAGNOSIS — R26.2 DIFFICULTY WALKING DOWN STAIRS: ICD-10-CM

## 2021-12-02 DIAGNOSIS — G89.29 CHRONIC PAIN OF RIGHT KNEE: Primary | ICD-10-CM

## 2021-12-02 PROCEDURE — 97530 THERAPEUTIC ACTIVITIES: CPT | Performed by: PHYSICAL THERAPIST

## 2021-12-02 PROCEDURE — 97110 THERAPEUTIC EXERCISES: CPT | Performed by: PHYSICAL THERAPIST

## 2021-12-02 NOTE — PROGRESS NOTES
Physical Therapy Daily Progress Note        Patient: Audrey Zapata   : 1950  Diagnosis/ICD-10 Code:  Chronic pain of right knee [M25.561, G89.29]  Referring practitioner: Diego Donohue MD  Date of Initial Visit: Type: THERAPY  Noted: 2021  Today's Date: 2021  Patient seen for 8 sessions         Audery Zapata reports: her knee is ok, about the same as usual, and she can tell it isn't quite right.  She said her pain is about a 4/10.  She doesn't understand why sometimes she can go down steps without problems but most of the time it hurts.         Subjective     Objective   See Exercise, Manual, and Modality Logs for complete treatment.       Assessment/Plan  Progressed strengthening this date without increased complaints of pain.  Difficulty noted with progressions however able to maintain technique following initial cues.  Continued with US to address lateral knee pain and added KT tape to provide support and pain relief with daily routine.  Will monitor tolerance to progressions and KT tape and continue to progress as able.     Progress per Plan of Care           Manual Therapy:         mins  58799;  Therapeutic Exercise:    28     mins  92241;     Neuromuscular Natasha:        mins  04610;    Therapeutic Activity:     12     mins  22413;     Gait Training:           mins  01932;     Ultrasound:     By PT Tech     mins  38089;    Electrical Stimulation:         mins  56448 ( );  Dry Needling          mins self-pay    Timed Treatment:   40   mins   Total Treatment:     50   mins    Marge Davila PTA  Physical Therapist Assistant

## 2021-12-06 ENCOUNTER — TREATMENT (OUTPATIENT)
Dept: PHYSICAL THERAPY | Facility: CLINIC | Age: 71
End: 2021-12-06

## 2021-12-06 DIAGNOSIS — G89.29 CHRONIC PAIN OF RIGHT KNEE: Primary | ICD-10-CM

## 2021-12-06 DIAGNOSIS — R26.2 DIFFICULTY WALKING DOWN STAIRS: ICD-10-CM

## 2021-12-06 DIAGNOSIS — M17.11 PRIMARY OSTEOARTHRITIS OF RIGHT KNEE: ICD-10-CM

## 2021-12-06 DIAGNOSIS — M25.561 CHRONIC PAIN OF RIGHT KNEE: Primary | ICD-10-CM

## 2021-12-06 PROCEDURE — 97110 THERAPEUTIC EXERCISES: CPT | Performed by: PHYSICAL THERAPIST

## 2021-12-06 PROCEDURE — 97530 THERAPEUTIC ACTIVITIES: CPT | Performed by: PHYSICAL THERAPIST

## 2021-12-06 NOTE — PROGRESS NOTES
Physical Therapy Daily Progress Note    Visit # : 9  Audrey Zapata reports: she is doing okay, but still having pain on the outside of her knee.      Subjective     Objective   See Exercise, Manual, and Modality Logs for complete treatment.     Pt with mild point tenderness over the right lateral patella     Assessment & Plan     Assessment    Assessment details: Pt is doing well with all exercises and tolerated all increases without pain.  Pt still remains tender to palpation over the right lateral patella, but reports of decreased pain with the KT taping.  Will continue to see pt 2x/week for stretching, strengthening and modalities PRN for pain.          Progress per Plan of Care           Manual Therapy:         mins  60007;  Therapeutic Exercise:   20      mins  14010;     Neuromuscular Natasha:        mins  16203;    Therapeutic Activity:    15      mins  17327;     Gait Training:           mins  56996;     Ultrasound:          mins  95298;  Performed by PT tech   Electrical Stimulation:         mins  71580 ( );  Dry Needling          mins self-pay    Timed Treatment:  35    mins   Total Treatment:    65    mins    Divya Butler, PT  Physical Therapist

## 2021-12-09 ENCOUNTER — TREATMENT (OUTPATIENT)
Dept: PHYSICAL THERAPY | Facility: CLINIC | Age: 71
End: 2021-12-09

## 2021-12-09 DIAGNOSIS — M25.561 CHRONIC PAIN OF RIGHT KNEE: Primary | ICD-10-CM

## 2021-12-09 DIAGNOSIS — G89.29 CHRONIC PAIN OF RIGHT KNEE: Primary | ICD-10-CM

## 2021-12-09 DIAGNOSIS — M17.11 PRIMARY OSTEOARTHRITIS OF RIGHT KNEE: ICD-10-CM

## 2021-12-09 DIAGNOSIS — R26.2 DIFFICULTY WALKING DOWN STAIRS: ICD-10-CM

## 2021-12-09 PROCEDURE — 97530 THERAPEUTIC ACTIVITIES: CPT | Performed by: PHYSICAL THERAPIST

## 2021-12-09 PROCEDURE — 97110 THERAPEUTIC EXERCISES: CPT | Performed by: PHYSICAL THERAPIST

## 2021-12-09 PROCEDURE — 97035 APP MDLTY 1+ULTRASOUND EA 15: CPT | Performed by: PHYSICAL THERAPIST

## 2021-12-09 NOTE — PROGRESS NOTES
"Physical Therapy Daily Progress Note    Visit # : 10  Audrey Zapata reports: she is doing pretty well today.  She was able to do the steps without difficulty and minimal discomfort.  Pt reports she was able to \"feel it, but it was not bad.\"     Subjective     Objective   See Exercise, Manual, and Modality Logs for complete treatment.     Minimal point tenderness over the right lateral patella       Assessment & Plan     Assessment    Assessment details: Pt continues to respond to treatment with decreasing pain and tenderness with improving ability to perform her functional activities.  Pt still has some point tenderness to the right lateral patella, but reports of decreased pain with the KT taping.  If pt continues to have minimal c/o pain and is independent with her HEP will plan for discharge to a HEP soon.          Progress per Plan of Care           Manual Therapy:         mins  12500;  Therapeutic Exercise:   35      mins  87207;     Neuromuscular Natasha:        mins  89121;    Therapeutic Activity:    15      mins  62610;     Gait Training:           mins  38100;     Ultrasound:     8     mins  93311;    Electrical Stimulation:         mins  66821 ( );  Dry Needling          mins self-pay    Timed Treatment:  58    mins   Total Treatment:    65    mins    Divya Butler, PT  Physical Therapist  "

## 2021-12-16 ENCOUNTER — TREATMENT (OUTPATIENT)
Dept: PHYSICAL THERAPY | Facility: CLINIC | Age: 71
End: 2021-12-16

## 2021-12-16 DIAGNOSIS — M25.561 CHRONIC PAIN OF RIGHT KNEE: Primary | ICD-10-CM

## 2021-12-16 DIAGNOSIS — G89.29 CHRONIC PAIN OF RIGHT KNEE: Primary | ICD-10-CM

## 2021-12-16 DIAGNOSIS — M17.11 PRIMARY OSTEOARTHRITIS OF RIGHT KNEE: ICD-10-CM

## 2021-12-16 DIAGNOSIS — R26.2 DIFFICULTY WALKING DOWN STAIRS: ICD-10-CM

## 2021-12-16 PROCEDURE — 97110 THERAPEUTIC EXERCISES: CPT | Performed by: PHYSICAL THERAPIST

## 2021-12-16 PROCEDURE — 97530 THERAPEUTIC ACTIVITIES: CPT | Performed by: PHYSICAL THERAPIST

## 2021-12-16 NOTE — PROGRESS NOTES
Re-Assessment / Re-Certification/Discharge         Patient: Audrey Zapata   : 1950  Diagnosis/ICD-10 Code:  Chronic pain of right knee [M25.561, G89.29]  Referring practitioner: Diego Donohue MD  Date of Initial Visit: 2021  Today's Date: 2021  Patient seen for 11 sessions      Subjective:   Audrey Zapata reports: pt states she is doing well and rates her right knee pain a 1-2/10 on a daily average.  She can even go down the steps without difficulty.  Pt states the tape really helps her knee.   Subjective   Objective          Tenderness     Right Knee   Tenderness in the lateral patella.     Additional Tenderness Details  Pt with minimal to no tenderness right lateral inferior patella    Active Range of Motion     Right Knee   Flexion: 118 degrees     Strength/Myotome Testing     Right Hip   Planes of Motion   Flexion: 5  Abduction: 5  Adduction: 5      Assessment & Plan     Assessment    Assessment details: Pt is doing well with minimal pain, improved ROM, improved strength, decreased tenderness, and improving functional mobility with less pain.  Pt has met 6.5/7 goals and feel pt is able to manage her symptoms independently with doing her HEP at least 3-4x/week.  Instructed pt in using the KT tape around her right knee for increased support.  Pt denied having any questions prior to leaving the clinic.  Discharged pt with the recommendation to continue with her HEP.        Progress toward previous goals: Partially Met      Goals  Plan Goals: STGs: 2-3 weeks  1. Decrease right  knee pain 4/10 with dressing her LEs and getting into/out of the car.  MET   2. Increase right knee AROM 2-120 degrees  PARTIALLY MET   3.  Decrease medial and lateral patella tenderness to minimal to none with palpation  MET     LTGs: 4-8 weeks  1.  Pt Independent with HEP.  MET   2.  Increase right knee strength to 5/5   MET   3.  Increase right hip add strength to 5/5   MET   4. Decrease right knee pain to a 2/10 with  descending steps, dressing her LE's, getting out of the car, and ambulating at least one mile. MET          Recommendations: Discharge      PT Signature: Divya Butler, PT [place] # [id number]  Electronically signed 12/16/2021       Manual Therapy:         mins  77864;  Therapeutic Exercise:   20      mins  95011;     Neuromuscular Natasha:        mins  50791;    Therapeutic Activity:    10      mins  09789;     Gait Training:           mins  96351;     Ultrasound:    8      mins  81361;  Performed by PT tech   Electrical Stimulation:         mins  58946 ( );  Traction                       ___ mins  74739    Timed Treatment:  30    mins   Total Treatment:    55    mins

## 2023-02-24 ENCOUNTER — APPOINTMENT (RX ONLY)
Dept: URBAN - METROPOLITAN AREA CLINIC 145 | Facility: CLINIC | Age: 73
Setting detail: DERMATOLOGY
End: 2023-02-24

## 2023-02-24 DIAGNOSIS — L57.0 ACTINIC KERATOSIS: ICD-10-CM | Status: INADEQUATELY CONTROLLED

## 2023-02-24 PROCEDURE — ? LIQUID NITROGEN

## 2023-02-24 PROCEDURE — 17003 DESTRUCT PREMALG LES 2-14: CPT

## 2023-02-24 PROCEDURE — 17000 DESTRUCT PREMALG LESION: CPT

## 2023-02-24 PROCEDURE — ? ADDITIONAL NOTES

## 2023-02-24 PROCEDURE — ? COUNSELING

## 2023-02-24 ASSESSMENT — LOCATION SIMPLE DESCRIPTION DERM
LOCATION SIMPLE: LEFT POSTERIOR UPPER ARM
LOCATION SIMPLE: LEFT FOREHEAD
LOCATION SIMPLE: RIGHT POSTERIOR UPPER ARM
LOCATION SIMPLE: RIGHT LIP
LOCATION SIMPLE: RIGHT CHEEK
LOCATION SIMPLE: CHIN
LOCATION SIMPLE: LEFT EYEBROW
LOCATION SIMPLE: LEFT CHEEK
LOCATION SIMPLE: RIGHT THIGH
LOCATION SIMPLE: LEFT PRETIBIAL REGION
LOCATION SIMPLE: RIGHT EYEBROW
LOCATION SIMPLE: LEFT LIP

## 2023-02-24 ASSESSMENT — LOCATION ZONE DERM
LOCATION ZONE: ARM
LOCATION ZONE: LIP
LOCATION ZONE: FACE
LOCATION ZONE: LEG

## 2023-02-24 ASSESSMENT — LOCATION DETAILED DESCRIPTION DERM
LOCATION DETAILED: LEFT PROXIMAL PRETIBIAL REGION
LOCATION DETAILED: RIGHT ANTERIOR PROXIMAL THIGH
LOCATION DETAILED: LEFT CHIN
LOCATION DETAILED: LEFT CENTRAL MALAR CHEEK
LOCATION DETAILED: LEFT LATERAL EYEBROW
LOCATION DETAILED: LEFT UPPER CUTANEOUS LIP
LOCATION DETAILED: LEFT INFERIOR FOREHEAD
LOCATION DETAILED: RIGHT UPPER CUTANEOUS LIP
LOCATION DETAILED: LEFT PROXIMAL POSTERIOR UPPER ARM
LOCATION DETAILED: LEFT FOREHEAD
LOCATION DETAILED: RIGHT PROXIMAL POSTERIOR UPPER ARM
LOCATION DETAILED: LEFT DISTAL PRETIBIAL REGION
LOCATION DETAILED: RIGHT LATERAL EYEBROW
LOCATION DETAILED: RIGHT CENTRAL MALAR CHEEK
LOCATION DETAILED: LEFT INFERIOR LATERAL FOREHEAD

## 2023-02-24 NOTE — PROCEDURE: LIQUID NITROGEN
Render Note In Bullet Format When Appropriate: No
Consent: The patient's consent was obtained including but not limited to risks of crusting, scabbing, blistering, scarring, darker or lighter pigmentary change, recurrence, incomplete removal and infection.
Show Applicator Variable?: Yes
Duration Of Freeze Thaw-Cycle (Seconds): 5
Post-Care Instructions: I reviewed with the patient in detail post-care instructions. Patient is to wear sunprotection, and avoid picking at any of the treated lesions. Pt may apply Vaseline to crusted or scabbing areas.
Detail Level: Detailed
Number Of Freeze-Thaw Cycles: 3 freeze-thaw cycles

## 2023-11-13 ENCOUNTER — LAB (OUTPATIENT)
Dept: LAB | Facility: HOSPITAL | Age: 73
End: 2023-11-13
Payer: MEDICARE

## 2023-11-13 ENCOUNTER — TRANSCRIBE ORDERS (OUTPATIENT)
Dept: ADMINISTRATIVE | Facility: HOSPITAL | Age: 73
End: 2023-11-13
Payer: MEDICARE

## 2023-11-13 DIAGNOSIS — R79.82 ELEVATED C-REACTIVE PROTEIN (CRP): Primary | ICD-10-CM

## 2023-11-13 DIAGNOSIS — R79.82 ELEVATED C-REACTIVE PROTEIN (CRP): ICD-10-CM

## 2023-11-13 LAB — CRP SERPL-MCNC: <0.3 MG/DL (ref 0–0.5)

## 2023-11-13 PROCEDURE — 86140 C-REACTIVE PROTEIN: CPT

## 2023-11-13 PROCEDURE — 36415 COLL VENOUS BLD VENIPUNCTURE: CPT

## 2023-12-29 ENCOUNTER — TRANSCRIBE ORDERS (OUTPATIENT)
Dept: ADMINISTRATIVE | Facility: HOSPITAL | Age: 73
End: 2023-12-29
Payer: MEDICARE

## 2023-12-29 DIAGNOSIS — M85.80 OSTEOPENIA, UNSPECIFIED LOCATION: Primary | ICD-10-CM

## 2024-03-18 ENCOUNTER — APPOINTMENT (RX ONLY)
Dept: URBAN - METROPOLITAN AREA CLINIC 145 | Facility: CLINIC | Age: 74
Setting detail: DERMATOLOGY
End: 2024-03-18

## 2024-03-18 DIAGNOSIS — L82.1 OTHER SEBORRHEIC KERATOSIS: ICD-10-CM

## 2024-03-18 DIAGNOSIS — L57.0 ACTINIC KERATOSIS: ICD-10-CM

## 2024-03-18 DIAGNOSIS — L81.4 OTHER MELANIN HYPERPIGMENTATION: ICD-10-CM

## 2024-03-18 PROCEDURE — ? COUNSELING

## 2024-03-18 PROCEDURE — 17000 DESTRUCT PREMALG LESION: CPT

## 2024-03-18 PROCEDURE — 17003 DESTRUCT PREMALG LES 2-14: CPT

## 2024-03-18 PROCEDURE — ? LIQUID NITROGEN

## 2024-03-18 PROCEDURE — 99213 OFFICE O/P EST LOW 20 MIN: CPT | Mod: 25

## 2024-03-18 ASSESSMENT — LOCATION DETAILED DESCRIPTION DERM
LOCATION DETAILED: RIGHT CENTRAL MALAR CHEEK
LOCATION DETAILED: LEFT INFERIOR FOREHEAD
LOCATION DETAILED: LEFT MEDIAL FOREHEAD
LOCATION DETAILED: LEFT MEDIAL MALAR CHEEK
LOCATION DETAILED: RIGHT CENTRAL EYEBROW
LOCATION DETAILED: RIGHT INFERIOR CENTRAL MALAR CHEEK
LOCATION DETAILED: LEFT INFERIOR CENTRAL MALAR CHEEK
LOCATION DETAILED: RIGHT MEDIAL FOREHEAD
LOCATION DETAILED: RIGHT CENTRAL TEMPLE

## 2024-03-18 ASSESSMENT — LOCATION SIMPLE DESCRIPTION DERM
LOCATION SIMPLE: RIGHT TEMPLE
LOCATION SIMPLE: LEFT FOREHEAD
LOCATION SIMPLE: LEFT CHEEK
LOCATION SIMPLE: RIGHT EYEBROW
LOCATION SIMPLE: RIGHT FOREHEAD
LOCATION SIMPLE: RIGHT CHEEK

## 2024-03-18 ASSESSMENT — LOCATION ZONE DERM: LOCATION ZONE: FACE

## 2024-04-17 ENCOUNTER — HOSPITAL ENCOUNTER (OUTPATIENT)
Dept: PET IMAGING | Facility: HOSPITAL | Age: 74
Discharge: HOME | End: 2024-04-17
Payer: MEDICARE

## 2024-04-17 ENCOUNTER — APPOINTMENT (OUTPATIENT)
Dept: WOMENS IMAGING | Facility: HOSPITAL | Age: 74
End: 2024-04-17
Payer: MEDICARE

## 2024-04-17 DIAGNOSIS — M85.80 OSTEOPENIA, UNSPECIFIED LOCATION: ICD-10-CM

## 2024-04-17 PROCEDURE — 77080 DXA BONE DENSITY AXIAL: CPT

## 2024-05-14 ENCOUNTER — LAB (OUTPATIENT)
Dept: LAB | Facility: HOSPITAL | Age: 74
End: 2024-05-14
Payer: MEDICARE

## 2024-05-14 ENCOUNTER — TRANSCRIBE ORDERS (OUTPATIENT)
Dept: ADMINISTRATIVE | Facility: HOSPITAL | Age: 74
End: 2024-05-14
Payer: MEDICARE

## 2024-05-14 DIAGNOSIS — E78.5 HYPERLIPIDEMIA, UNSPECIFIED HYPERLIPIDEMIA TYPE: Primary | ICD-10-CM

## 2024-05-14 DIAGNOSIS — M81.0 OSTEOPOROSIS, POSTMENOPAUSAL: ICD-10-CM

## 2024-05-14 DIAGNOSIS — E78.5 HYPERLIPIDEMIA, UNSPECIFIED HYPERLIPIDEMIA TYPE: ICD-10-CM

## 2024-05-14 LAB
25(OH)D3 SERPL-MCNC: 97.5 NG/ML (ref 30–100)
ALBUMIN SERPL-MCNC: 4.2 G/DL (ref 3.5–5.2)
ALBUMIN/GLOB SERPL: 1.4 G/DL
ALP SERPL-CCNC: 81 U/L (ref 39–117)
ALT SERPL W P-5'-P-CCNC: 12 U/L (ref 1–33)
ANION GAP SERPL CALCULATED.3IONS-SCNC: 9.8 MMOL/L (ref 5–15)
AST SERPL-CCNC: 23 U/L (ref 1–32)
BASOPHILS # BLD AUTO: 0.08 10*3/MM3 (ref 0–0.2)
BASOPHILS NFR BLD AUTO: 1 % (ref 0–1.5)
BILIRUB SERPL-MCNC: 0.3 MG/DL (ref 0–1.2)
BILIRUB UR QL STRIP: NEGATIVE
BUN SERPL-MCNC: 14 MG/DL (ref 8–23)
BUN/CREAT SERPL: 14.4 (ref 7–25)
CALCIUM SPEC-SCNC: 9.7 MG/DL (ref 8.6–10.5)
CHLORIDE SERPL-SCNC: 106 MMOL/L (ref 98–107)
CHOLEST SERPL-MCNC: 231 MG/DL (ref 0–200)
CLARITY UR: CLEAR
CO2 SERPL-SCNC: 27.2 MMOL/L (ref 22–29)
COLOR UR: YELLOW
CREAT SERPL-MCNC: 0.97 MG/DL (ref 0.57–1)
DEPRECATED RDW RBC AUTO: 39.4 FL (ref 37–54)
EGFRCR SERPLBLD CKD-EPI 2021: 61.1 ML/MIN/1.73
EOSINOPHIL # BLD AUTO: 0.16 10*3/MM3 (ref 0–0.4)
EOSINOPHIL NFR BLD AUTO: 2 % (ref 0.3–6.2)
ERYTHROCYTE [DISTWIDTH] IN BLOOD BY AUTOMATED COUNT: 12 % (ref 12.3–15.4)
GLOBULIN UR ELPH-MCNC: 2.9 GM/DL
GLUCOSE SERPL-MCNC: 95 MG/DL (ref 65–99)
GLUCOSE UR STRIP-MCNC: NEGATIVE MG/DL
HCT VFR BLD AUTO: 40.8 % (ref 34–46.6)
HDLC SERPL-MCNC: 63 MG/DL (ref 40–60)
HGB BLD-MCNC: 13.5 G/DL (ref 12–15.9)
HGB UR QL STRIP.AUTO: NEGATIVE
HOLD SPECIMEN: NORMAL
IMM GRANULOCYTES # BLD AUTO: 0.02 10*3/MM3 (ref 0–0.05)
IMM GRANULOCYTES NFR BLD AUTO: 0.3 % (ref 0–0.5)
KETONES UR QL STRIP: NEGATIVE
LDLC SERPL CALC-MCNC: 148 MG/DL (ref 0–100)
LDLC/HDLC SERPL: 2.3 {RATIO}
LEUKOCYTE ESTERASE UR QL STRIP.AUTO: NEGATIVE
LYMPHOCYTES # BLD AUTO: 2.06 10*3/MM3 (ref 0.7–3.1)
LYMPHOCYTES NFR BLD AUTO: 26.3 % (ref 19.6–45.3)
MCH RBC QN AUTO: 29.3 PG (ref 26.6–33)
MCHC RBC AUTO-ENTMCNC: 33.1 G/DL (ref 31.5–35.7)
MCV RBC AUTO: 88.7 FL (ref 79–97)
MONOCYTES # BLD AUTO: 0.62 10*3/MM3 (ref 0.1–0.9)
MONOCYTES NFR BLD AUTO: 7.9 % (ref 5–12)
NEUTROPHILS NFR BLD AUTO: 4.89 10*3/MM3 (ref 1.7–7)
NEUTROPHILS NFR BLD AUTO: 62.5 % (ref 42.7–76)
NITRITE UR QL STRIP: NEGATIVE
NRBC BLD AUTO-RTO: 0 /100 WBC (ref 0–0.2)
PH UR STRIP.AUTO: 6.5 [PH] (ref 5–8)
PLATELET # BLD AUTO: 305 10*3/MM3 (ref 140–450)
PMV BLD AUTO: 10.6 FL (ref 6–12)
POTASSIUM SERPL-SCNC: 4.5 MMOL/L (ref 3.5–5.2)
PROT SERPL-MCNC: 7.1 G/DL (ref 6–8.5)
PROT UR QL STRIP: NEGATIVE
PTH-INTACT SERPL-MCNC: 26.7 PG/ML (ref 15–65)
RBC # BLD AUTO: 4.6 10*6/MM3 (ref 3.77–5.28)
SODIUM SERPL-SCNC: 143 MMOL/L (ref 136–145)
SP GR UR STRIP: 1.01 (ref 1–1.03)
TRIGL SERPL-MCNC: 115 MG/DL (ref 0–150)
TSH SERPL DL<=0.05 MIU/L-ACNC: 2.2 UIU/ML (ref 0.27–4.2)
UROBILINOGEN UR QL STRIP: NORMAL
VLDLC SERPL-MCNC: 20 MG/DL (ref 5–40)
WBC NRBC COR # BLD AUTO: 7.83 10*3/MM3 (ref 3.4–10.8)

## 2024-05-14 PROCEDURE — 84443 ASSAY THYROID STIM HORMONE: CPT

## 2024-05-14 PROCEDURE — 85025 COMPLETE CBC W/AUTO DIFF WBC: CPT

## 2024-05-14 PROCEDURE — 81003 URINALYSIS AUTO W/O SCOPE: CPT

## 2024-05-14 PROCEDURE — 36415 COLL VENOUS BLD VENIPUNCTURE: CPT

## 2024-05-14 PROCEDURE — 83970 ASSAY OF PARATHORMONE: CPT

## 2024-05-14 PROCEDURE — 80061 LIPID PANEL: CPT

## 2024-05-14 PROCEDURE — 82306 VITAMIN D 25 HYDROXY: CPT

## 2024-05-14 PROCEDURE — 80053 COMPREHEN METABOLIC PANEL: CPT

## 2025-03-10 ENCOUNTER — OFFICE VISIT (OUTPATIENT)
Dept: ORTHOPEDIC SURGERY | Facility: CLINIC | Age: 75
End: 2025-03-10
Payer: MEDICARE

## 2025-03-10 VITALS — HEIGHT: 64 IN | BODY MASS INDEX: 25.1 KG/M2 | WEIGHT: 147 LBS | TEMPERATURE: 98.2 F

## 2025-03-10 DIAGNOSIS — R52 PAIN: ICD-10-CM

## 2025-03-10 DIAGNOSIS — M16.12 PRIMARY OSTEOARTHRITIS OF LEFT HIP: Primary | ICD-10-CM

## 2025-03-10 RX ORDER — ALENDRONATE SODIUM 70 MG/75ML
70 SOLUTION ORAL
COMMUNITY

## 2025-03-10 NOTE — PROGRESS NOTES
Patient: Audrey Zapata    YOB: 1950    Medical Record Number: [id number]    Chief Complaints:  Left hip pain    History of Present Illness:     74 y.o. female patient who comes in today for evaluation of a new complaint of  lefthip pain. Denies any discreet precipitating event or factor.   The pain is moderate,intermittent and aching.  The pain is worse with prolonged standing or walking.  Rest helps.  Patient states symptoms are about a month ago.  Mostly at night she has an achy type pain usually resolves on its own frequency has decreased.  Overall doing pretty well today.    Denies any shooting pain down the leg, weakness, numbness or paresthesias.  Denies any fever shortness of breath.    Allergies: No Known Allergies    Medications:     Home Medications:  Current Outpatient Medications on File Prior to Visit   Medication Sig Dispense Refill    alendronate (FOSAMAX) 70 MG/75ML solution Take 75 mL by mouth Every 7 (Seven) Days.      minocycline (MINOCIN,DYNACIN) 100 MG capsule Take 1 capsule by mouth Daily.      Estring 2 MG vaginal ring  (Patient not taking: Reported on 3/10/2025)      ketorolac (ACULAR) 0.5 % ophthalmic solution PLACE 1 DROP INTO RIGHT EYE 3 TIMES A DAY FOR 3 DAYS, THEN STOP      meloxicam (Mobic) 15 MG tablet Take 1 tablet by mouth Daily. (Patient not taking: Reported on 3/10/2025) 30 tablet 1    meloxicam (MOBIC) 15 MG tablet Take 1 tablet by mouth Daily As Needed for Moderate Pain . Take as directed with food. 30 tablet 2     No current facility-administered medications on file prior to visit.     Past Medical History:   Diagnosis Date    Cancer     SKIN CANCER     Past Surgical History:   Procedure Laterality Date     SECTION       SECTION       Social History     Occupational History    Not on file   Tobacco Use    Smoking status: Never    Smokeless tobacco: Never   Vaping Use    Vaping status: Never Used   Substance and Sexual Activity    Alcohol  "use: Yes     Comment: 2 DRINKS PER WEEK    Drug use: No    Sexual activity: Defer      Social History     Social History Narrative    Not on file     Family History   Problem Relation Age of Onset    Stroke Other     Skin cancer Other        Review of Systems:      Constitutional: Denies fever, shaking or chills     All other pertinent positives and negatives as noted above in HPI.    Physical Exam: 74 y.o. female  Vitals:    03/10/25 1325   Temp: 98.2 °F (36.8 °C)   TempSrc: Temporal   Weight: 66.7 kg (147 lb)   Height: 162.6 cm (64\")     General:  Patient is awake and alert.  Appears in no acute distress or discomfort.        Left lower extremity:  Skin is benign.  No palpable masses or adenopathy.  No focal area of tenderness.  Hip motion full and painless.  Negative Stinchfield.  Good strength with hip flexion, extension, abduction.  Good strength distally with plantarflexion and dorsiflexion of ankle, toes.  Sensation to light touch intact distally in the lower leg and foot.  Good pedal pulses with brisk cap refill.    Radiology: AP pelvis and lateral views of the left hip are ordered by myself and reviewed to evaluate the patient's complaint.  The x-rays show mild hip osteoarthritis with joint space narrowing, subchondral sclerosis and osteophyte formation.  There are no obvious acute abnormalities, lesions, masses, or other concerning findings.    Comparison films not available    Assessment:  Left hip osteoarthritis      Plan:    I had a lengthy discussion with the patient regarding options, both surgical and non-surgical.  I have recommended that we start with a conservative approach and suggested anti-inflammatories, physical therapy, and an injection.  All options were thoroughly discussed with the patient.  The patient has elected for conservative treatment as noted above.  Will follow up as needed. Patient understands and agrees.      Jere Fairchild MD    03/10/2025    CC to Funmi Esteban MD       "

## 2025-03-14 ENCOUNTER — PATIENT ROUNDING (BHMG ONLY) (OUTPATIENT)
Dept: ORTHOPEDIC SURGERY | Facility: CLINIC | Age: 75
End: 2025-03-14
Payer: MEDICARE

## 2025-03-14 NOTE — PROGRESS NOTES
March 14, 2025      A [employer] Message has been sent to the patient for PATIENT ROUNDING with [facility] [place]

## 2025-06-16 ENCOUNTER — TRANSCRIBE ORDERS (OUTPATIENT)
Dept: ADMINISTRATIVE | Facility: HOSPITAL | Age: 75
End: 2025-06-16
Payer: MEDICARE

## 2025-06-16 ENCOUNTER — LAB (OUTPATIENT)
Dept: LAB | Facility: HOSPITAL | Age: 75
End: 2025-06-16
Payer: MEDICARE

## 2025-06-16 DIAGNOSIS — E78.5 HYPERLIPIDEMIA, UNSPECIFIED HYPERLIPIDEMIA TYPE: Primary | ICD-10-CM

## 2025-06-16 DIAGNOSIS — M81.0 SENILE OSTEOPOROSIS: ICD-10-CM

## 2025-06-16 DIAGNOSIS — E78.5 HYPERLIPIDEMIA, UNSPECIFIED HYPERLIPIDEMIA TYPE: ICD-10-CM

## 2025-06-16 LAB
25(OH)D3 SERPL-MCNC: 87.4 NG/ML (ref 30–100)
ALBUMIN SERPL-MCNC: 4.4 G/DL (ref 3.5–5.2)
ALBUMIN/GLOB SERPL: 1.5 G/DL
ALP SERPL-CCNC: 73 U/L (ref 39–117)
ALT SERPL W P-5'-P-CCNC: 18 U/L (ref 1–33)
ANION GAP SERPL CALCULATED.3IONS-SCNC: 9 MMOL/L (ref 5–15)
AST SERPL-CCNC: 23 U/L (ref 1–32)
BACTERIA UR QL AUTO: ABNORMAL /HPF
BASOPHILS # BLD AUTO: 0.07 10*3/MM3 (ref 0–0.2)
BASOPHILS NFR BLD AUTO: 1.2 % (ref 0–1.5)
BILIRUB SERPL-MCNC: 0.7 MG/DL (ref 0–1.2)
BILIRUB UR QL STRIP: NEGATIVE
BUN SERPL-MCNC: 12 MG/DL (ref 8–23)
BUN/CREAT SERPL: 10.8 (ref 7–25)
CALCIUM SPEC-SCNC: 9.6 MG/DL (ref 8.6–10.5)
CHLORIDE SERPL-SCNC: 106 MMOL/L (ref 98–107)
CHOLEST SERPL-MCNC: 225 MG/DL (ref 0–200)
CLARITY UR: ABNORMAL
CO2 SERPL-SCNC: 26 MMOL/L (ref 22–29)
COLOR UR: YELLOW
CREAT SERPL-MCNC: 1.11 MG/DL (ref 0.57–1)
DEPRECATED RDW RBC AUTO: 40 FL (ref 37–54)
EGFRCR SERPLBLD CKD-EPI 2021: 51.6 ML/MIN/1.73
EOSINOPHIL # BLD AUTO: 0.1 10*3/MM3 (ref 0–0.4)
EOSINOPHIL NFR BLD AUTO: 1.7 % (ref 0.3–6.2)
ERYTHROCYTE [DISTWIDTH] IN BLOOD BY AUTOMATED COUNT: 12.9 % (ref 12.3–15.4)
GLOBULIN UR ELPH-MCNC: 3 GM/DL
GLUCOSE SERPL-MCNC: 93 MG/DL (ref 65–99)
GLUCOSE UR STRIP-MCNC: NEGATIVE MG/DL
HCT VFR BLD AUTO: 43.9 % (ref 34–46.6)
HDLC SERPL-MCNC: 62 MG/DL (ref 40–60)
HGB BLD-MCNC: 14.6 G/DL (ref 12–15.9)
HGB UR QL STRIP.AUTO: NEGATIVE
HYALINE CASTS UR QL AUTO: ABNORMAL /LPF
IMM GRANULOCYTES # BLD AUTO: 0.01 10*3/MM3 (ref 0–0.05)
IMM GRANULOCYTES NFR BLD AUTO: 0.2 % (ref 0–0.5)
KETONES UR QL STRIP: ABNORMAL
LDLC SERPL CALC-MCNC: 144 MG/DL (ref 0–100)
LDLC/HDLC SERPL: 2.29 {RATIO}
LEUKOCYTE ESTERASE UR QL STRIP.AUTO: ABNORMAL
LYMPHOCYTES # BLD AUTO: 2.17 10*3/MM3 (ref 0.7–3.1)
LYMPHOCYTES NFR BLD AUTO: 37.8 % (ref 19.6–45.3)
MCH RBC QN AUTO: 28.9 PG (ref 26.6–33)
MCHC RBC AUTO-ENTMCNC: 33.3 G/DL (ref 31.5–35.7)
MCV RBC AUTO: 86.8 FL (ref 79–97)
MONOCYTES # BLD AUTO: 0.44 10*3/MM3 (ref 0.1–0.9)
MONOCYTES NFR BLD AUTO: 7.7 % (ref 5–12)
NEUTROPHILS NFR BLD AUTO: 2.95 10*3/MM3 (ref 1.7–7)
NEUTROPHILS NFR BLD AUTO: 51.4 % (ref 42.7–76)
NITRITE UR QL STRIP: NEGATIVE
NRBC BLD AUTO-RTO: 0 /100 WBC (ref 0–0.2)
PH UR STRIP.AUTO: 5.5 [PH] (ref 5–8)
PLATELET # BLD AUTO: 324 10*3/MM3 (ref 140–450)
PMV BLD AUTO: 10.5 FL (ref 6–12)
POTASSIUM SERPL-SCNC: 4.7 MMOL/L (ref 3.5–5.2)
PROT SERPL-MCNC: 7.4 G/DL (ref 6–8.5)
PROT UR QL STRIP: NEGATIVE
RBC # BLD AUTO: 5.06 10*6/MM3 (ref 3.77–5.28)
RBC # UR STRIP: ABNORMAL /HPF
REF LAB TEST METHOD: ABNORMAL
SODIUM SERPL-SCNC: 141 MMOL/L (ref 136–145)
SP GR UR STRIP: 1.02 (ref 1–1.03)
SQUAMOUS #/AREA URNS HPF: ABNORMAL /HPF
TRIGL SERPL-MCNC: 105 MG/DL (ref 0–150)
UROBILINOGEN UR QL STRIP: ABNORMAL
VLDLC SERPL-MCNC: 19 MG/DL (ref 5–40)
WBC # UR STRIP: ABNORMAL /HPF
WBC NRBC COR # BLD AUTO: 5.74 10*3/MM3 (ref 3.4–10.8)

## 2025-06-16 PROCEDURE — 80053 COMPREHEN METABOLIC PANEL: CPT

## 2025-06-16 PROCEDURE — 80061 LIPID PANEL: CPT

## 2025-06-16 PROCEDURE — 36415 COLL VENOUS BLD VENIPUNCTURE: CPT

## 2025-06-16 PROCEDURE — 85025 COMPLETE CBC W/AUTO DIFF WBC: CPT

## 2025-06-16 PROCEDURE — 81001 URINALYSIS AUTO W/SCOPE: CPT

## 2025-06-16 PROCEDURE — 82306 VITAMIN D 25 HYDROXY: CPT

## 2025-07-09 ENCOUNTER — PREP FOR SURGERY (OUTPATIENT)
Dept: OTHER | Facility: HOSPITAL | Age: 75
End: 2025-07-09
Payer: MEDICARE

## 2025-07-09 DIAGNOSIS — Z12.11 SCREEN FOR COLON CANCER: Primary | ICD-10-CM

## 2025-07-16 ENCOUNTER — HOSPITAL ENCOUNTER (OUTPATIENT)
Dept: CT IMAGING | Facility: HOSPITAL | Age: 75
Discharge: HOME | End: 2025-07-16
Admitting: INTERNAL MEDICINE

## 2025-07-16 DIAGNOSIS — E78.5 HYPERLIPIDEMIA, UNSPECIFIED HYPERLIPIDEMIA TYPE: ICD-10-CM

## 2025-07-16 PROCEDURE — 75571 CT HRT W/O DYE W/CA TEST: CPT
